# Patient Record
Sex: FEMALE | Race: WHITE | Employment: UNEMPLOYED | URBAN - METROPOLITAN AREA
[De-identification: names, ages, dates, MRNs, and addresses within clinical notes are randomized per-mention and may not be internally consistent; named-entity substitution may affect disease eponyms.]

---

## 2020-10-27 ENCOUNTER — HOSPITAL ENCOUNTER (OUTPATIENT)
Dept: LAB | Age: 60
Discharge: HOME | End: 2020-10-27

## 2020-10-27 PROCEDURE — 88305 TISSUE EXAM BY PATHOLOGIST: CPT

## 2022-05-19 LAB
AVERAGE GLUCOSE: ABNORMAL
HBA1C MFR BLD: 7.3 %

## 2022-07-07 NOTE — PROGRESS NOTES
New Patient Abstract (Uro/Onc)    MRN: [id number]    PATIENT'S NAME: Bari Bond    LAST SEEN UROLOGIST: Dr. Tisha Sandoval    PCP: Terrence Nur MD    CHIEF COMPLAINT: Kidney mass    PMH: Colon polyps, NAFLD, osteopenia, DM2, obesity, lumbar stenosis, paraplegia, caudi equina syndrome, neurogenic bowel, neurogenic bladder, chronic cystitis, HTN, basal cell carcinoma (nose), non-toxic unimodular goiter, fibromyalgia, rosacea, fibrocystic breast disease, calculus of kidney, MSSA infection, neuropathy, left breast biopsy, urethroplasty, abdominal hysterectomy, and several orthopedic surgeries. FAMILY HX: Family history is significant for father with bladder cancer; mother and one sister with basal cell carcinoma; and one paternal aunt with breast cancer. NARRATIVE WITH RECENT WITH RESULTS/PROCEDURES/BIOPSIES AND DATES COMPLETED: Ms. Bony Junior is a 65-year-old white female who was evaluated in consultation by Gastroenterologist, Dr. Bello Kennedy at [facility] on 6/13/22 after being referred by her PCP for evaluation of elevated LFTs. Patients alkaline phosphatase and ALT had been persistently elevated with the last several labs drawn by her PCP. CMP drawn on 6/13/22 was unremarkable with AST of 29, ALT 46, and alkaline phosphatase 119. PT/INR and platelet count were within normal limits. Dr. Richards Said noted, Abnormal LFTs, suspect NAFLD given history of obesity, diabetes, dyslipidemia, patient had HCV antibody and surface antigen checked a few years ago and were unremarkable, patient denied any history of heavy alcohol use, no regular heavy Tylenol use, uses NSAIDs once in a month.  Patient was recommended for further evaluation with RUQ ultrasound. Right upper quadrant ultrasound was performed on 6/22/22 identifying a 4.2 cm mass arising from the midpole of the left kidney, worrisome for a renal cell carcinoma. CT of the abdomen and pelvis was subsequently performed the same day.  CT report noted, No evidence of solid renal mass. Persistent fetal lobulation is noted which can artifactually simulate a mass at ultrasound however.  Referral was placed to [facility] for Uro/onc evaluation and treatment. PCP noted, CT is very reassuring that there is no tumor or mass would still like her to see the urologist but can relax about having cancer.     After referral was placed, patient was seen by her PCP on 7/1/22 with left lower back pain and concern for UTI. Due to neurogenic bladder, patient self-catheterizes 5-6 times per day. MD noted, UA negative. Reassurance provided. She is quite worried about her recent CT findings and wonders if this may be contributing.      PROCEDURES/PATHOLOGY:     LABS:          IMAGING:     RIGHT UPPER QUADRANT ULTRASOUND 6/22/22  ULTRASOUND FINDINGS:   Liver: 19.1 cm length. Hepatomegaly is evident. The overall echotexture of the liver is diffusely inhomogeneous with some increased echogenicity. It is consistent with diffuse hepatocellular disease. The major consideration is hepatic steatosis. Portal Vein: Hepatopetal.   Gallbladder: Status post cholecystectomy. No sonographic Garcia's sign. Biliary Tree: 2.9 mm common duct diameter. Pancreas: The pancreas has a normal echotexture. No mass lesion is seen. Right Kidney: 11.3 cm. Normal renal parenchyma. Left kidney: There is a medium echogenicity solid inhomogeneous hypoechoic mass arising from the cortex of the left kidney. It is seen in the midpole measuring 2.7 x 4.2 x 3.8 cm. This finding is worrisome for a primary renal neoplasm. Peritoneal Cavity: Normal, no ascites. Pleura Space - Diaphragm: Normal, no pleural fluid. IMPRESSION:   1. Status post cholecystectomy. 2. Hepatomegaly with hepatic steatosis. 3. 4.2 cm mass arising from the midpole of the left kidney. This finding is worrisome for a renal cell carcinoma. Additional evaluation recommended.      CT ABDOMEN - PELVIS WITH CONTRAST 6/22/22  FINDINGS:   Lower Chest:  Unremarkable. Liver:  Diffusely decreased attenuation of the liver suggestive of hepatic steatosis. Gallbladder:  Prior cholecystectomy. Adrenals: Unremarkable. Kidneys:  Persistent fetal lobulation is noted bilaterally. There are subcentimeter bilateral renal hypodensities too small to definitively characterize. Punctate nonobstructing left lower pole renal calculus is present. No evidence of solid renal mass. Spleen:  Unremarkable. Pancreas:  Unremarkable. Vasculature:  Scattered atherosclerotic disease. Lymphatics:  Unremarkable. Gastrointestinal:  Unremarkable. Bladder:  Unremarkable. Reproductive: The uterus is not visualized. Musculoskeletal:  There are changes of discectomy and posterior fusion from L4 to S1 bilaterally. IMPRESSION: No evidence of solid renal mass. Persistent fetal lobulation is noted which can artifactually simulate a mass at ultrasound however.

## 2022-07-11 ENCOUNTER — OFFICE VISIT (OUTPATIENT)
Dept: ONCOLOGY | Age: 62
End: 2022-07-11
Payer: MEDICARE

## 2022-07-11 VITALS
HEART RATE: 106 BPM | SYSTOLIC BLOOD PRESSURE: 119 MMHG | TEMPERATURE: 99.2 F | RESPIRATION RATE: 18 BRPM | DIASTOLIC BLOOD PRESSURE: 72 MMHG | OXYGEN SATURATION: 95 %

## 2022-07-11 DIAGNOSIS — N28.89 RENAL MASS, LEFT: Primary | ICD-10-CM

## 2022-07-11 PROCEDURE — 99204 OFFICE O/P NEW MOD 45 MIN: CPT | Performed by: UROLOGY

## 2022-07-11 RX ORDER — ROSUVASTATIN CALCIUM 10 MG/1
10 TABLET, COATED ORAL DAILY
COMMUNITY
Start: 2022-02-25

## 2022-07-11 RX ORDER — LANCETS 33 GAUGE
EACH MISCELLANEOUS
COMMUNITY
Start: 2022-06-13

## 2022-07-11 RX ORDER — COVID-19 ANTIGEN TEST
KIT MISCELLANEOUS
COMMUNITY
Start: 2022-06-16

## 2022-07-11 RX ORDER — CETIRIZINE HYDROCHLORIDE 10 MG/1
10 TABLET ORAL 2 TIMES DAILY PRN
COMMUNITY
Start: 2021-10-13

## 2022-07-11 RX ORDER — GUARN/MA-HUANG/P.GIN/S.GINSENG
600 TABLET ORAL 2 TIMES DAILY
COMMUNITY

## 2022-07-11 RX ORDER — CEFADROXIL 500 MG/1
500 CAPSULE ORAL 2 TIMES DAILY
COMMUNITY
Start: 2017-06-08 | End: 2023-01-18

## 2022-07-11 RX ORDER — LISINOPRIL 5 MG/1
5 TABLET ORAL DAILY
COMMUNITY
Start: 2022-02-25

## 2022-07-11 ASSESSMENT — PATIENT HEALTH QUESTIONNAIRE - PHQ9
8. MOVING OR SPEAKING SO SLOWLY THAT OTHER PEOPLE COULD HAVE NOTICED. OR THE OPPOSITE, BEING SO FIGETY OR RESTLESS THAT YOU HAVE BEEN MOVING AROUND A LOT MORE THAN USUAL: 0
7. TROUBLE CONCENTRATING ON THINGS, SUCH AS READING THE NEWSPAPER OR WATCHING TELEVISION: 0
2. FEELING DOWN, DEPRESSED OR HOPELESS: 0
SUM OF ALL RESPONSES TO PHQ QUESTIONS 1-9: 0
SUM OF ALL RESPONSES TO PHQ QUESTIONS 1-9: 0
SUM OF ALL RESPONSES TO PHQ9 QUESTIONS 1 & 2: 0
9. THOUGHTS THAT YOU WOULD BE BETTER OFF DEAD, OR OF HURTING YOURSELF: 0
1. LITTLE INTEREST OR PLEASURE IN DOING THINGS: 0
6. FEELING BAD ABOUT YOURSELF - OR THAT YOU ARE A FAILURE OR HAVE LET YOURSELF OR YOUR FAMILY DOWN: 0
SUM OF ALL RESPONSES TO PHQ QUESTIONS 1-9: 0
10. IF YOU CHECKED OFF ANY PROBLEMS, HOW DIFFICULT HAVE THESE PROBLEMS MADE IT FOR YOU TO DO YOUR WORK, TAKE CARE OF THINGS AT HOME, OR GET ALONG WITH OTHER PEOPLE: 0
SUM OF ALL RESPONSES TO PHQ QUESTIONS 1-9: 0
3. TROUBLE FALLING OR STAYING ASLEEP: 0
5. POOR APPETITE OR OVEREATING: 0

## 2022-07-11 ASSESSMENT — ENCOUNTER SYMPTOMS
GASTROINTESTINAL NEGATIVE: 1
RESPIRATORY NEGATIVE: 1

## 2022-07-11 NOTE — PATIENT INSTRUCTIONS
Patient Instructions from Today's Visit    Reason for Visit:  New Patient     Diagnosis Information:  [email or web address]    Plan:  CT scan reviewed - no kidney mass. No further imaging needed. Keep follow up apts regarding liver. Please call us if you have any questions or concerns before your next visit. Follow Up:  No follow up     Recent Lab Results:  No visits with results within 3 Day(s) from this visit. Latest known visit with results is:   Orders Only on 09/15/2009   Component Date Value Ref Range Status    RAYMOND 09/15/2009    Final                    Value:None Detected  (NOTE)  TEST INFORMATION: Anti-Nuclear Ab (RAYMOND), IgG  RAYMOND specimens are screened using an SHALONDA assay. All  specimens that screen positive or equivocal are confirmed  using HEp-2 cells, and if positive, the titer and pattern  will be reported. Performed by [address] [address] [facility] [phone]  www. [facility], Juliet Onofre MD, Lab. Director           Treatment Summary has been discussed and given to patient: N/A        -------------------------------------------------------------------------------------------------------------------  Please call our office at [phone] if you have any  of the following symptoms:   · Fever of 100.5 or greater  · Chills  · Shortness of breath  · Swelling or pain in one leg    After office hours an answering service is available and will contact a provider for emergencies or if you are experiencing any of the above symptoms.  Patient does express an interest in My Chart. My Chart log in information explained on the after visit summary printout at the [facility] desk.     Rica Nevarez RN

## 2022-07-11 NOTE — PROGRESS NOTES
[address] [facility]  [address] [place] [address]  [phone]          Manav Pena  : 1960      INITIAL EVALUATION    Chief Complaint   Patient presents with    Follow-up       HPI: Meño Mcmanus is a 57 y.o. female with a possible renal mass. Patient was being evaluated for elevated LFTs and underwent a right upper quadrant ultrasound. This was on 2022. She was found to have hepatic steatosis and there was concern for a 4 cm left midpole renal mass. She then on the same day underwent a CT of the abdomen pelvis with IV contrast and there was no solid renal mass found. Instead she had persistent fetal lobulations of both kidneys. She is a paraplegic and is wheelchair-bound. She denies any issues with flank pain or gross hematuria.     PMH:     Past Medical History:   Diagnosis Date    Candidiasis of vulva and vagina 2013    Endometriosis 2013    Female stress incontinence 2013    Fibromyalgia 2013    Incontinence without sensory awareness 2013    Neurogenic bladder, NOS 2013    Obesity 2013    Other chronic cystitis 2013    Other ill-defined conditions(799.89)     tmj, fibromaylgia, cuada equina    Urinary tract infection, site not specified 2013       PSH:    Past Surgical History:   Procedure Laterality Date    BREAST SURGERY      CHOLECYSTECTOMY      GYN      hysterectomy    HEENT      SINUS SURGERY     ORTHOPEDIC SURGERY      lumbar    OTHER SURGICAL HISTORY      LAMINECTOMY/LAMINOTOMY    WISDOM TOOTH EXTRACTION         MEDs:    Current Outpatient Medications   Medication Sig Dispense Refill    CRANBERRY CONCENTRATE PO Take 1 capsule by mouth daily      Calcium 600-200 MG-UNIT TABS Take 600 mg by mouth 2 times daily      BIOTIN 5000 PO       CALCIUM PO       cefadroxil (DURICEF) 500 MG capsule Take 500 mg by mouth 2 times daily      cetirizine (ZYRTEC) 10 MG tablet Take 10 mg by mouth 2 times daily as needed      QUICKVUE AT-HOME COVID-19 TEST KIT USE AS DIRECTED      Glycerin (OASIS TEARS PF OP)       Eyelid Cleansers (OCUSOFT HYPOCHLOR EX)       Lancets (ONETOUCH DELICA PLUS FOOWCH12G) MISC TEST ONCE DAILY DX: S52.2 ONE TOUCH DELICA      lisinopril (PRINIVIL;ZESTRIL) 5 MG tablet Take 5 mg by mouth daily      rosuvastatin (CRESTOR) 10 MG tablet Take 10 mg by mouth daily      SOOTHING SALINE NASAL MIST NA       DULoxetine (CYMBALTA) 60 MG extended release capsule Take 60 mg by mouth 2 times daily      ergocalciferol (ERGOCALCIFEROL) 1.25 MG (82691 UT) capsule Take 50,000 Units by mouth      Lactobacillus Rhamnosus, GG, (RA PROBIOTIC DIGESTIVE CARE) CAPS Take 1 capsule by mouth daily      ascorbic acid (VITAMIN C) 500 MG tablet Take 500 mg by mouth      calcium citrate (CALCITRATE) 950 (200 Ca) MG tablet Take by mouth daily      calcium citrate-vitamin D (CITRACAL+D) 315-200 MG-UNIT per tablet Take 1 tablet by mouth daily (with breakfast)      fluticasone (FLONASE) 50 MCG/ACT nasal spray Nightly.  levETIRAcetam (KEPPRA) 250 MG tablet Take 250 mg by mouth      lidocaine (LIDODERM) 5 % Place 1 patch onto the skin every 24 hours      loratadine (CLARITIN) 10 MG tablet Take 10 mg by mouth daily      Lutein 20 MG CAPS Take by mouth      meloxicam (MOBIC) 15 MG tablet Take 15 mg by mouth daily      metFORMIN (GLUCOPHAGE-XR) 500 MG extended release tablet 1 tab daily      metroNIDAZOLE, TOPICAL, 0.75 % LOTN Apply topically      omeprazole (PRILOSEC) 20 MG delayed release capsule Take 20 mg by mouth daily      Red Yeast Rice Extract 600 MG CAPS Take 600 mg by mouth      tiZANidine (ZANAFLEX) 4 MG tablet Take 8 mg by mouth       No current facility-administered medications for this visit.        ALLERGIES:     Allergies   Allergen Reactions    Codeine Other (See Comments)    Erythromycin Other (See Comments)    Fenofibrate Micronized Other (See Comments)    Hydrocodone Other (See Comments)    Pregabalin Other (See Comments)       FH:     History reviewed. No pertinent family history. SH:     Social History     Socioeconomic History    Marital status:      Spouse name: Not on file    Number of children: Not on file    Years of education: Not on file    Highest education level: Not on file   Occupational History    Not on file   Tobacco Use    Smoking status: Never Smoker    Smokeless tobacco: Never Used   Substance and Sexual Activity    Alcohol use: Yes    Drug use: No    Sexual activity: Not on file   Other Topics Concern    Not on file   Social History Narrative    Not on file     Social Determinants of Health     Financial Resource Strain:     Difficulty of Paying Living Expenses: Not on file   Food Insecurity:     Worried About Running Out of Food in the Last Year: Not on file    Eladia of Food in the Last Year: Not on file   Transportation Needs:     Lack of Transportation (Medical): Not on file    Lack of Transportation (Non-Medical):  Not on file   Physical Activity:     Days of Exercise per Week: Not on file    Minutes of Exercise per Session: Not on file   Stress:     Feeling of Stress : Not on file   Social Connections:     Frequency of Communication with Friends and Family: Not on file    Frequency of Social Gatherings with Friends and Family: Not on file    Attends Mandaen Services: Not on file    Active Member of [address] [place] [address] or Organizations: Not on file    Attends Club or Organization Meetings: Not on file    Marital Status: Not on file   Intimate Partner Violence:     Fear of Current or Ex-Partner: Not on file    Emotionally Abused: Not on file    Physically Abused: Not on file    Sexually Abused: Not on file   Housing Stability:     Unable to Pay for Housing in the Last Year: Not on file    Number of [place] in the Last Year: Not on file    Unstable Housing in the Last Year: Not on file       ROS:   Review of Systems   Constitutional: Negative. Negative for chills, fatigue and fever. Respiratory: Negative. Cardiovascular: Negative. Gastrointestinal: Negative. Genitourinary: Negative. Negative for difficulty urinating, dysuria, flank pain, frequency, hematuria and urgency. Musculoskeletal: Negative. All other systems reviewed and are negative. PHYSICAL EXAM  GENERAL: Well-groomed, well-nourished, pleasant 57 y.o. female, in no acute distress. /72 Comment: sitting  Pulse (!) 106   Temp 99.2 °F (37.3 °C)   Resp 18   SpO2 95%   General: well dressed, well nourished, no acute distress  Skin: no rashes  HEENT: Sclera are clear,normocephalic, atraumatic. no external lesions  Cardiovascular: Reg. Normal perfusion  Respiratory: normal respiratory effort, no JVD, no audible wheezing. Musculoskeletal: unremarkable with normal function. No embolic signs or cyanosis. Neurologic exam: intact, no focal deficits, moves all 4 extremities  Psych: normal mood and affect, alert, oriented x 3  LE:  no edema  GI: soft, nontender, no masses, no CVA tenderness  Lymphatic: no axillary, inguinal, cervical or supraclavicular adenopathy  GENITOURINARY: deffered    Labs:   Creat 0.83      Imaging/Radiology:    XR Results (maximum last 3): No results found for this or any previous visit. CT Results (maximum last 3): No results found for this or any previous visit. In PACS from outside hospital.  Images reviewed today. ASSESSMENT: Juan Mascorro is a 57 y.o. female with completely normal-appearing kidneys on CT. I discussed with the patient today about fetal lobulations and how they can often be confused for solid renal masses on ultrasound. I reviewed her CT scan results with her. At this point I recommended no further imaging and no further urologic oncology follow-up.   She will continue to follow with her primary doctors and will continue to have her liver

## 2024-06-24 ENCOUNTER — TRANSCRIBE ORDERS (OUTPATIENT)
Dept: SCHEDULING | Age: 64
End: 2024-06-24

## 2024-06-24 DIAGNOSIS — M85.89 OTHER SPECIFIED DISORDERS OF BONE DENSITY AND STRUCTURE, MULTIPLE SITES: ICD-10-CM

## 2024-06-24 DIAGNOSIS — G82.20 PARAPLEGIA (HCC): Primary | ICD-10-CM
